# Patient Record
(demographics unavailable — no encounter records)

---

## 2017-10-06 NOTE — EKG
Test Reason : PREOP

Blood Pressure : ***/*** mmHG

Vent. Rate : 054 BPM     Atrial Rate : 054 BPM

   P-R Int : 162 ms          QRS Dur : 096 ms

    QT Int : 448 ms       P-R-T Axes : 042 -27 030 degrees

   QTc Int : 424 ms

 

Sinus bradycardia

Otherwise normal ECG

 

Confirmed by XANDER WILLIS ([id number]) on 10/6/2017 6:25:53 AM

 

Referred By:  EDUARD           Confirmed By:XANDER WILLIS

## 2017-10-13 NOTE — DIS
DATE OF ADMISSION:  10/12/2017

 

DATE OF DISCHARGE:  10/13/2017

 

DISCHARGING PHYSICIAN:  Darron Ratliff MD

 

SUMMARY:  Mr. Granados was brought in yesterday as an outpatient for planned heart catheterization.  He 
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
te ramus and ostium of the left circumflex.  These were stented in a kissing fashion.  He had bradyc
ardia and chest pain during the inflations consistent with his symptoms.  He was observed overnight 
and this morning he is doing well.  His blood work is unremarkable.  He denies any chest pain, tight
ness, pressure.  No shortness of breath.  He had a small run of nonsustained VT overnight, about 10 
beats worth.  He was asymptomatic during the episode.  He should be able to be discharged home today
.  We will send him home and we will follow up with him in 2 weeks.  We will continue all of his out
patient medications as previously prescribed with ACE inhibitor, Brilinta and aspirin.  Statin alrea
dy on board.  He is not at goal, his LDL is 91.  We will plan on up-titration in the office.

 

Over 30 minutes were spent at bedside for discharge.